# Patient Record
Sex: FEMALE | NOT HISPANIC OR LATINO | Employment: OTHER | URBAN - METROPOLITAN AREA
[De-identification: names, ages, dates, MRNs, and addresses within clinical notes are randomized per-mention and may not be internally consistent; named-entity substitution may affect disease eponyms.]

---

## 2021-04-13 ENCOUNTER — OFFICE VISIT (OUTPATIENT)
Dept: GASTROENTEROLOGY | Facility: CLINIC | Age: 61
End: 2021-04-13

## 2021-04-13 VITALS — HEIGHT: 61 IN | TEMPERATURE: 96.8 F | BODY MASS INDEX: 20.96 KG/M2 | WEIGHT: 111 LBS

## 2021-04-13 DIAGNOSIS — K58.2 IRRITABLE BOWEL SYNDROME WITH BOTH CONSTIPATION AND DIARRHEA: Primary | ICD-10-CM

## 2021-04-13 DIAGNOSIS — R63.0 ANOREXIA: ICD-10-CM

## 2021-04-13 PROCEDURE — 99204 OFFICE O/P NEW MOD 45 MIN: CPT | Performed by: INTERNAL MEDICINE

## 2021-04-13 RX ORDER — PANCRELIPASE LIPASE, PANCRELIPASE PROTEASE, PANCRELIPASE AMYLASE 40000; 126000; 168000 [USP'U]/1; [USP'U]/1; [USP'U]/1
CAPSULE, DELAYED RELEASE ORAL
COMMUNITY
Start: 2021-03-22

## 2021-04-13 RX ORDER — SUCRALFATE 1 G/1
1 TABLET ORAL AS NEEDED
COMMUNITY

## 2021-04-13 RX ORDER — LOSARTAN POTASSIUM 25 MG/1
25 TABLET ORAL DAILY
COMMUNITY
Start: 2021-03-30

## 2021-04-13 RX ORDER — RIMEGEPANT SULFATE 75 MG/75MG
TABLET, ORALLY DISINTEGRATING ORAL
COMMUNITY
Start: 2021-04-09

## 2021-04-13 RX ORDER — ROPINIROLE 5 MG/1
TABLET, FILM COATED ORAL
COMMUNITY

## 2021-04-13 RX ORDER — ESOMEPRAZOLE MAGNESIUM 40 MG/1
40 CAPSULE, DELAYED RELEASE ORAL 2 TIMES DAILY
COMMUNITY
Start: 2021-03-22

## 2021-04-13 RX ORDER — ALBUTEROL SULFATE 90 UG/1
AEROSOL, METERED RESPIRATORY (INHALATION)
COMMUNITY

## 2021-04-13 RX ORDER — ALPRAZOLAM 1 MG/1
1 TABLET ORAL AS NEEDED
COMMUNITY

## 2021-04-13 RX ORDER — HYDROCHLOROTHIAZIDE 12.5 MG/1
12.5 CAPSULE, GELATIN COATED ORAL DAILY
COMMUNITY
Start: 2021-02-27

## 2021-04-13 RX ORDER — NORTRIPTYLINE HYDROCHLORIDE 10 MG/1
10 CAPSULE ORAL NIGHTLY
Qty: 30 CAPSULE | Refills: 11 | Status: SHIPPED | OUTPATIENT
Start: 2021-04-13

## 2021-04-13 RX ORDER — POTASSIUM CHLORIDE 750 MG/1
CAPSULE, EXTENDED RELEASE ORAL
COMMUNITY
Start: 2021-04-06

## 2021-04-13 NOTE — PROGRESS NOTES
Chief Complaint   Patient presents with   • Ulcerative Colitis     Sherry Espana is a 60 y.o. female who presents with a history of abdominal pain episodic nausea diarrhea rectal bleeding.  HPI     Patient 60-year-old female with history of hypertension and asthma with migraine headaches carries a diagnosis initially of Crohn's disease and ulcerative colitis reflux and Candida esophagitis here for evaluation.  Patient's last upper and colon done in January showed minimal esophagitis with negative gastric biopsies as well as duodenal biopsies and a normal colonic biopsies inconsistent with either Crohn's or ulcerative colitis.  Work-up was extensive including ultrasound CAT scan HIDA scan gastric emptying scan all of which was well within normal limits.  Patient treated for pancreatic insufficiency as well as ulcer disease with no significant improvement in her symptoms.  Patient here for further recommendations.    Past Medical History:   Diagnosis Date   • Asthma    • Hypertension    • Migraine    • Restless leg    • Ulcerative colitis (CMS/HCC)        Current Outpatient Medications:   •  albuterol sulfate  (90 Base) MCG/ACT inhaler, albuterol sulfate HFA 90 mcg/actuation aerosol inhaler, Disp: , Rfl:   •  ALPRAZolam (XANAX) 1 MG tablet, alprazolam 1 mg tablet, Disp: , Rfl:   •  esomeprazole (nexIUM) 40 MG capsule, Take 40 mg by mouth 2 (Two) Times a Day., Disp: , Rfl:   •  hydroCHLOROthiazide (MICROZIDE) 12.5 MG capsule, , Disp: , Rfl:   •  losartan (COZAAR) 25 MG tablet, Take 25 mg by mouth Daily., Disp: , Rfl:   •  Nurtec 75 MG dispersible tablet, PLACE 1 TABLET BY TRANSLINGUAL ROUTE ON TOP OF TONGUE, ALLOW TO DISSOLVE THEN SWALLOW ONCE AS NEEDED FOR MIGRAINE; MAX 1 DOSE/24 HRS, Disp: , Rfl:   •  potassium chloride (MICRO-K) 10 MEQ CR capsule, , Disp: , Rfl:   •  rOPINIRole (REQUIP) 5 MG tablet, ropinirole 5 mg tablet, Disp: , Rfl:   •  sucralfate (CARAFATE) 1 g tablet, sucralfate 1 gram tablet, Disp: ,  Rfl:   •  Zenpep [id number] units capsule delayed-release particles capsule, TAKE 1 CAPSULE BY ORAL ROUTE 3 TIMES EVERY DAY WITH MEALS AND 1 CAPSULE WITH EACH SNACK SWALLOWING WHOLE. DO NOT CRUSH, CHEW AND/OR DIVIDE., Disp: , Rfl:   Allergies   Allergen Reactions   • Levaquin [Levofloxacin] Anaphylaxis     Social History     Socioeconomic History   • Marital status:      Spouse name: Not on file   • Number of children: Not on file   • Years of education: Not on file   • Highest education level: Not on file   Tobacco Use   • Smoking status: Current Every Day Smoker     Packs/day: 1.00   • Smokeless tobacco: Never Used   Substance and Sexual Activity   • Alcohol use: Never   • Drug use: Never     History reviewed. No pertinent family history.  Review of Systems   Constitutional: Positive for appetite change. Negative for activity change, chills, diaphoresis, fatigue, fever and unexpected weight change.   HENT: Negative.    Eyes: Negative.    Respiratory: Negative.    Cardiovascular: Negative.    Gastrointestinal: Positive for abdominal pain, constipation, diarrhea, nausea, rectal pain and vomiting. Negative for abdominal distention, anal bleeding and blood in stool.   Endocrine: Negative.    Musculoskeletal: Negative.    Skin: Negative.    Allergic/Immunologic: Negative.    Hematological: Negative.      Vitals:    04/13/21 1539   Temp: 96.8 °F (36 °C)     Physical Exam  Vitals and nursing note reviewed.   Constitutional:       Appearance: Normal appearance. She is well-developed and normal weight.   HENT:      Head: Normocephalic and atraumatic.   Eyes:      General: No scleral icterus.     Pupils: Pupils are equal, round, and reactive to light.   Cardiovascular:      Rate and Rhythm: Normal rate and regular rhythm.      Heart sounds: Normal heart sounds. No murmur heard.   No friction rub. No gallop.    Pulmonary:      Effort: Pulmonary effort is normal.      Breath sounds: Normal breath sounds. No wheezing  or rales.   Abdominal:      General: Bowel sounds are normal. There is no distension or abdominal bruit.      Palpations: Abdomen is soft. Abdomen is not rigid. There is no shifting dullness, fluid wave, mass or pulsatile mass.      Tenderness: There is no abdominal tenderness. There is no guarding.      Hernia: No hernia is present.   Musculoskeletal:         General: No swelling or tenderness. Normal range of motion.      Cervical back: Normal range of motion and neck supple. No rigidity.   Skin:     General: Skin is warm and dry.      Coloration: Skin is not jaundiced.      Findings: No rash.   Neurological:      General: No focal deficit present.      Mental Status: She is alert and oriented to person, place, and time.      Cranial Nerves: No cranial nerve deficit.   Psychiatric:         Behavior: Behavior normal.         Thought Content: Thought content normal.       Diagnoses and all orders for this visit:    Irritable bowel syndrome with both constipation and diarrhea    Anorexia    Other orders  -     ALPRAZolam (XANAX) 1 MG tablet; alprazolam 1 mg tablet  -     rOPINIRole (REQUIP) 5 MG tablet; ropinirole 5 mg tablet  -     Nurtec 75 MG dispersible tablet; PLACE 1 TABLET BY TRANSLINGUAL ROUTE ON TOP OF TONGUE, ALLOW TO DISSOLVE THEN SWALLOW ONCE AS NEEDED FOR MIGRAINE; MAX 1 DOSE/24 HRS  -     losartan (COZAAR) 25 MG tablet; Take 25 mg by mouth Daily.  -     esomeprazole (nexIUM) 40 MG capsule; Take 40 mg by mouth 2 (Two) Times a Day.  -     Zenpep [id number] units capsule delayed-release particles capsule; TAKE 1 CAPSULE BY ORAL ROUTE 3 TIMES EVERY DAY WITH MEALS AND 1 CAPSULE WITH EACH SNACK SWALLOWING WHOLE. DO NOT CRUSH, CHEW AND/OR DIVIDE.  -     potassium chloride (MICRO-K) 10 MEQ CR capsule  -     hydroCHLOROthiazide (MICROZIDE) 12.5 MG capsule  -     albuterol sulfate  (90 Base) MCG/ACT inhaler; albuterol sulfate HFA 90 mcg/actuation aerosol inhaler  -     sucralfate (CARAFATE) 1 g tablet;  sucralfate 1 gram tablet    Patient 60-year-old female with history of hypertension migraine headaches presenting with recurrent abdominal pain episodes of nausea vomiting diarrhea bleeding from the lower GI tract.  Patient asked with extensive work-up including EGD colonoscopy CT scan ultrasound and HIDA scan gastric emptying scan.  Patient with a history of diagnosis of Crohn's disease and ulcerative colitis diverticulitis reflux and Candida esophagitis.  Extensive charting brought with the patient reveals that her last set of endoscopies of upper and colon showed some foveal [place] hyperplasia in the stomach with grade a esophagitis and normal colonic biopsies.  No changes of Crohn's or ulcerative colitis though likely did have an episode of acute diverticulitis and acute esophagitis no chronic disease noted question whether this seems all to be related to IBS as a result of previous injury certainly is possible but patient now is anorexic with worry that eating will cause more pain.  Will begin treatment for IBS and monitor clinical response.  If no improvement may go further and evaluate for possible mesenteric ischemia and intestinal angina but findings more consistent with IBS.  Will treat begin with nortriptyline and follow clinical response.

## 2021-04-28 ENCOUNTER — TELEPHONE (OUTPATIENT)
Dept: GASTROENTEROLOGY | Facility: CLINIC | Age: 61
End: 2021-04-28

## 2021-04-28 NOTE — TELEPHONE ENCOUNTER
Per verbal order of Dr. Coronado: Try increasing Pamelor to 2 tablets at bedtime and add Zofran 4 mg every 8 as needed nausea

## 2021-04-28 NOTE — TELEPHONE ENCOUNTER
Returned patient's phone call. She states she has stopped vomiting acid, until this past Sunday. Poor appetite continues.   She states on Sunday she passed liquid stool and then past solid stool and then had another bout of liquid stool.   States since Sunday she has been vomiting 2-4 times a day liquid.   She complains the back of her throat is raw.   Patient is drinking Ensure and a occasional soda.   Patient states she continues taking her medication which was prescribed by Dr. Coronado.

## 2021-04-28 NOTE — TELEPHONE ENCOUNTER
Patient called. Advised as per verbal order of Dr. Coronado. She verb understanding.   Prescriptions for Nortriptyline and Zofran were called into patient's pharmacy.

## 2021-04-28 NOTE — TELEPHONE ENCOUNTER
----- Message from Mary Messer sent at 4/28/2021 11:12 AM EDT -----  Regarding: pt med update  Contact: [phone]  Pt is updating, vomitting has stopped some. Sunday, she had vomitting and diarrhea/constipation. Please call pt to advise. Thank you    nortriptyline (PAMELOR) 10 MG capsule 30 capsule 11 4/13/2021    Sig - Route: Take 1 capsule by mouth Every Night. - Oral   Sent to pharmacy as: Nortriptyline HCl 10 MG Oral Capsule (PAMELOR)   E-Prescribing Status: Receipt confirmed by pharmacy (4/13/2021  6:25 PM EDT)

## 2021-05-12 ENCOUNTER — TELEPHONE (OUTPATIENT)
Dept: GASTROENTEROLOGY | Facility: CLINIC | Age: 61
End: 2021-05-12

## 2021-05-12 NOTE — TELEPHONE ENCOUNTER
Returned phone call to JUNE Grady. She states she runs a clinic. She states the patient was seen on Friday for abdominal pain, dehydration, vomiting, and rectal bleeding. Ysabel states the patient reports the blood is bright red in color.   States she areli blood on Friday and the patient Hgb is normal. Questions if there is tests she can order before the patient is seen by Dr. Coronado on 7/8. Advised will send an update to Dr. Coronado. She verb understanding.

## 2021-05-20 RX ORDER — HYDROCORTISONE 25 MG/G
CREAM TOPICAL
Qty: 1 EACH | Refills: 0 | Status: SHIPPED | OUTPATIENT
Start: 2021-05-20

## 2021-05-20 RX ORDER — ONDANSETRON 4 MG/1
4 TABLET, FILM COATED ORAL EVERY 8 HOURS PRN
Qty: 30 TABLET | Refills: 1 | Status: SHIPPED | OUTPATIENT
Start: 2021-05-20

## 2021-06-02 ENCOUNTER — TELEPHONE (OUTPATIENT)
Dept: GASTROENTEROLOGY | Facility: CLINIC | Age: 61
End: 2021-06-02

## 2021-06-02 NOTE — TELEPHONE ENCOUNTER
----- Message from Mary Messer sent at 6/2/2021  4:19 PM EDT -----  Regarding: pt called  Contact: [phone]  Please call pt in regards to abd pain and bleeding.

## 2021-06-02 NOTE — TELEPHONE ENCOUNTER
Called pt. She states she is having a colitis/diverticulitis flare.  She was recently in [facility] for 4 days d/t colitis, diverticulitis, rectal bleeding and dehydration.  She was treated with flagyl and steroids. She is having rectal bleeding and abdominal pain again and is asking for a prescription.  She lives in [place], [place]. Her appt with Dr Coronado is 7/8/21.  Advised will send a msg to Nadia ABREU for recommendations.     I have also requested pt's medical records.

## 2021-06-03 NOTE — TELEPHONE ENCOUNTER
I do not feel comfortable prescribing antibiotics without a CT scan and labs to see what is going on.  It may or may not be diverticulitis again.  I would have her come to the [facility] ER here for immediate evaluation.  Thanks

## 2021-06-18 ENCOUNTER — TELEPHONE (OUTPATIENT)
Dept: GASTROENTEROLOGY | Facility: CLINIC | Age: 61
End: 2021-06-18

## 2021-06-18 NOTE — TELEPHONE ENCOUNTER
----- Message from Mary Sheffield sent at 6/18/2021 11:42 AM EDT -----  Regarding: talk to ronnie  Contact: [phone]  Pt left a message wanting to talk to Ronnie about her recent hospital visit.

## 2021-06-22 NOTE — TELEPHONE ENCOUNTER
Per verbal order of Dr. Coronado: patient does not have diverticulitis. She has constipation. Have patient start Miralax one cap full until BM.   Patient called, advised as per verbal order of Dr. Coronado. She verb understanding and will call on Friday with a health update.

## 2021-06-22 NOTE — TELEPHONE ENCOUNTER
Returned patient's phone call.   She states she was discharged from hospital yesterday for kidney stone. She passed the stone.   She states she has started with rectal bleeding again. She states her medical records were faxed to our office. Advised we did not received them and will call the hospital.   Called [facility] requested medical records, spoke with Ysabel who states she will fax them today.

## 2021-07-08 ENCOUNTER — OFFICE VISIT (OUTPATIENT)
Dept: GASTROENTEROLOGY | Facility: CLINIC | Age: 61
End: 2021-07-08

## 2021-07-08 VITALS — WEIGHT: 112 LBS | HEIGHT: 64 IN | BODY MASS INDEX: 19.12 KG/M2 | TEMPERATURE: 98.5 F

## 2021-07-08 DIAGNOSIS — I70.1 RENAL ARTERY STENOSIS (HCC): Primary | ICD-10-CM

## 2021-07-08 DIAGNOSIS — K58.2 IRRITABLE BOWEL SYNDROME WITH BOTH CONSTIPATION AND DIARRHEA: ICD-10-CM

## 2021-07-08 PROCEDURE — 99213 OFFICE O/P EST LOW 20 MIN: CPT | Performed by: INTERNAL MEDICINE

## 2021-07-08 RX ORDER — PROMETHAZINE HYDROCHLORIDE 12.5 MG/1
12.5 TABLET ORAL EVERY 6 HOURS PRN
Qty: 120 TABLET | Refills: 5 | Status: SHIPPED | OUTPATIENT
Start: 2021-07-08

## 2021-07-08 RX ORDER — FAMOTIDINE 40 MG/1
TABLET, FILM COATED ORAL
COMMUNITY
Start: 2021-06-21

## 2021-07-08 RX ORDER — KETOROLAC TROMETHAMINE 10 MG/1
TABLET, FILM COATED ORAL
COMMUNITY
Start: 2021-06-03

## 2021-07-08 RX ORDER — ZOLPIDEM TARTRATE 10 MG/1
TABLET ORAL
COMMUNITY
Start: 2021-06-21

## 2021-07-08 NOTE — PROGRESS NOTES
Chief Complaint   Patient presents with   • Follow-up   • Irritable Bowel Syndrome       Sherry Espana is a  60 y.o. female here for a follow up visit for IBS with abdominal pain.    HPI     Patient 60-year-old female with history of migraine headaches as well as IBS.  Ulcerative colitis listed as of the past medical history which is patently incorrect as colonoscopies have been negative.  Patient with ongoing abdominal pain alternating constipation and diarrhea with retained stool on CT.  Patient just in the hospital again with rectal bleeding dehydration found with renal artery stenosis.  Patient here for further recommendations.    Past Medical History:   Diagnosis Date   • Asthma    • Hypertension    • Migraine    • Restless leg    • Ulcerative colitis (CMS/HCC)          Current Outpatient Medications:   •  albuterol sulfate  (90 Base) MCG/ACT inhaler, albuterol sulfate HFA 90 mcg/actuation aerosol inhaler, Disp: , Rfl:   •  ALPRAZolam (XANAX) 1 MG tablet, Take 1 mg by mouth As Needed., Disp: , Rfl:   •  esomeprazole (nexIUM) 40 MG capsule, Take 40 mg by mouth 2 (Two) Times a Day., Disp: , Rfl:   •  famotidine (PEPCID) 40 MG tablet, TAKE ONE TABLET TWO TIMES A DAY FOR STOMACH, Disp: , Rfl:   •  hydroCHLOROthiazide (MICROZIDE) 12.5 MG capsule, Take 12.5 mg by mouth Daily., Disp: , Rfl:   •  Hydrocortisone, Perianal, (ANUSOL-HC) 2.5 % rectal cream, Apply cream twice daily externally to rectal area for 14 days., Disp: 1 each, Rfl: 0  •  losartan (COZAAR) 25 MG tablet, Take 25 mg by mouth Daily., Disp: , Rfl:   •  nortriptyline (PAMELOR) 10 MG capsule, Take 1 capsule by mouth Every Night., Disp: 30 capsule, Rfl: 11  •  Nurtec 75 MG dispersible tablet, PLACE 1 TABLET BY TRANSLINGUAL ROUTE ON TOP OF TONGUE, ALLOW TO DISSOLVE THEN SWALLOW ONCE AS NEEDED FOR MIGRAINE; MAX 1 DOSE/24 HRS, Disp: , Rfl:   •  ondansetron (Zofran) 4 MG tablet, Take 1 tablet by mouth Every 8 (Eight) Hours As Needed for Nausea or  Vomiting., Disp: 30 tablet, Rfl: 1  •  potassium chloride (MICRO-K) 10 MEQ CR capsule, , Disp: , Rfl:   •  rOPINIRole (REQUIP) 5 MG tablet, ropinirole 5 mg tablet, Disp: , Rfl:   •  sucralfate (CARAFATE) 1 g tablet, 1 g As Needed., Disp: , Rfl:   •  zolpidem (AMBIEN) 10 MG tablet, TAKE ONE TABLET BY MOUTH EVERY DAY AT BEDTIME AS NEEDED FOR INSOMNIA, Disp: , Rfl:   •  ketorolac (TORADOL) 10 MG tablet, , Disp: , Rfl:   •  Zenpep [id number] units capsule delayed-release particles capsule, TAKE 1 CAPSULE BY ORAL ROUTE 3 TIMES EVERY DAY WITH MEALS AND 1 CAPSULE WITH EACH SNACK SWALLOWING WHOLE. DO NOT CRUSH, CHEW AND/OR DIVIDE., Disp: , Rfl:     Allergies   Allergen Reactions   • Levaquin [Levofloxacin] Anaphylaxis       Social History     Socioeconomic History   • Marital status:      Spouse name: Not on file   • Number of children: Not on file   • Years of education: Not on file   • Highest education level: Not on file   Tobacco Use   • Smoking status: Current Every Day Smoker     Packs/day: 1.00   • Smokeless tobacco: Never Used   Substance and Sexual Activity   • Alcohol use: Never   • Drug use: Never       History reviewed. No pertinent family history.    Review of Systems   Constitutional: Negative.    Respiratory: Negative.    Cardiovascular: Negative.    Gastrointestinal: Negative.    Musculoskeletal: Negative.    Skin: Negative.    Hematological: Negative.        Vitals:    07/08/21 1419   Temp: 98.5 °F (36.9 °C)       Physical Exam  Vitals reviewed.   Constitutional:       Appearance: She is well-developed.   HENT:      Head: Normocephalic and atraumatic.   Eyes:      General: No scleral icterus.     Pupils: Pupils are equal, round, and reactive to light.   Cardiovascular:      Rate and Rhythm: Normal rate and regular rhythm.      Heart sounds: Normal heart sounds.   Pulmonary:      Effort: Pulmonary effort is normal. No respiratory distress.      Breath sounds: Normal breath sounds.   Abdominal:       General: Bowel sounds are normal. There is no distension.      Palpations: Abdomen is soft. There is no mass.      Tenderness: There is no abdominal tenderness.      Hernia: No hernia is present.   Skin:     General: Skin is warm and dry.      Coloration: Skin is not jaundiced.      Findings: No rash.   Neurological:      General: No focal deficit present.      Mental Status: She is alert and oriented to person, place, and time.   Psychiatric:         Behavior: Behavior normal.         Thought Content: Thought content normal.         No visits with results within 2 Month(s) from this visit.   Latest known visit with results is:   No results found for any previous visit.       Diagnoses and all orders for this visit:    1. Renal artery stenosis (CMS/HCC) (Primary)  -     Ambulatory Referral to Vascular Surgery    2. Irritable bowel syndrome with both constipation and diarrhea      Patient 60-year-old female with history of IBS here for follow-up.  Patient status post CT angiogram revealing significant bilateral renal artery stenosis.  Will refer to vascular surgery to evaluate for further recommendations for her ongoing constipation and alternating bowel habits we will continue Pamelor for the improved abdominal cramping she is experience but will add Linzess to 90 to help keep the bowels cleared and follow-up clinical response.

## 2021-07-15 ENCOUNTER — TELEPHONE (OUTPATIENT)
Dept: GASTROENTEROLOGY | Facility: CLINIC | Age: 61
End: 2021-07-15

## 2021-07-21 ENCOUNTER — TELEPHONE (OUTPATIENT)
Dept: GASTROENTEROLOGY | Facility: CLINIC | Age: 61
End: 2021-07-21

## 2021-07-21 NOTE — TELEPHONE ENCOUNTER
----- Message from Mary Sheffield sent at 7/21/2021 11:00 AM EDT -----  Regarding: meds  Contact: [phone]  Pt is wanting to talk to nurse regarding the new medication.

## 2021-07-22 NOTE — TELEPHONE ENCOUNTER
Returned patient's phone call. She states the combination of the Linzess and the other medications, she is feeling much better.   Requesting a prescription of Linzess 290 be called into her pharmacy.   Medication called into [facility]. Spoke with Jacky Gallagher. Verbal prescription for Linzess 290 mcg one tablet every morning #90 with 3 refills.   He verb understanding.

## 2021-07-26 ENCOUNTER — TELEPHONE (OUTPATIENT)
Dept: GASTROENTEROLOGY | Facility: CLINIC | Age: 61
End: 2021-07-26

## 2021-07-26 NOTE — TELEPHONE ENCOUNTER
----- Message from Mary Moore sent at 7/26/2021 10:19 AM EDT -----  Regarding: FAX  Contact: [phone]  PT called in stating that she spoke with [facility] where [facility] the Referral to and they asked for it to be done by phone. I spoke to David and she asked for the facesheet, CT and referral to be sent over for PT DX Renal artery stenosis. Please advise, thank you.    Fax: [phone]

## 2021-08-20 ENCOUNTER — TELEPHONE (OUTPATIENT)
Dept: GASTROENTEROLOGY | Facility: CLINIC | Age: 61
End: 2021-08-20

## 2021-08-23 NOTE — TELEPHONE ENCOUNTER
Patient called. She states she was in [facility] for diverticulitis. She is currently taking Flagyl 250 mg one tablet twice a day.   She was told by the ER doc to contact Dr. Coronado regarding her visit. She states she has the disc from the CT scan that was perform.   Advised to bring the disc with her on the day of her appointment.   Faxed request for ER notes from 8/19@ [phone]. Confirmation received.     Office notes from her visit with Dr. Hobson were scanned into patient's chart. Update to Dr. Coronado.

## 2021-08-23 NOTE — TELEPHONE ENCOUNTER
Patient called. Advised as per verbal order of Dr. Coronado, please refer patient to [facility] vascular surgery. Have patient take all of her X-ray discs with her.   Patient called, advised as per Dr. Coronado's note. She verb understanding and is agreeable to the plan. Update to Dagmar.

## 2021-09-02 ENCOUNTER — OFFICE VISIT (OUTPATIENT)
Dept: GASTROENTEROLOGY | Facility: CLINIC | Age: 61
End: 2021-09-02

## 2021-09-02 VITALS — HEIGHT: 64 IN | TEMPERATURE: 96 F | BODY MASS INDEX: 19.12 KG/M2 | WEIGHT: 112 LBS

## 2021-09-02 DIAGNOSIS — K58.2 IRRITABLE BOWEL SYNDROME WITH BOTH CONSTIPATION AND DIARRHEA: Primary | ICD-10-CM

## 2021-09-02 PROCEDURE — 99213 OFFICE O/P EST LOW 20 MIN: CPT | Performed by: INTERNAL MEDICINE

## 2021-09-02 RX ORDER — CLOPIDOGREL BISULFATE 75 MG/1
TABLET ORAL
COMMUNITY
Start: 2021-09-01

## 2021-09-02 RX ORDER — PREDNISONE 10 MG/1
TABLET ORAL
COMMUNITY
Start: 2021-09-01

## 2021-09-02 RX ORDER — ASPIRIN 81 MG/1
81 TABLET ORAL DAILY
COMMUNITY

## 2021-09-02 NOTE — PROGRESS NOTES
Chief Complaint   Patient presents with   • Follow-up       Sherry Espana is a  60 y.o. female here for a follow up visit for IBS.    HPI     Patient 60-year-old female with history of hypertension, migraine headaches as well as renal artery stenosis and IBS with alternating constipation and diarrhea.  Patient reports since starting Linzess is actually having too much diarrhea with dehydration ended up having diverticulitis per the patient.  Patient reports was in the hospital in her area in August 20 and was told she had diverticulitis and started on Flagyl.  Patient reports stools have improved and underwent vascular procedure on Monday for her renal artery stenosis.  Patient reports her blood pressure is now resolved off medicine.    Past Medical History:   Diagnosis Date   • Asthma    • Hypertension    • Migraine    • Restless leg    • Ulcerative colitis (CMS/HCC)          Current Outpatient Medications:   •  albuterol sulfate  (90 Base) MCG/ACT inhaler, albuterol sulfate HFA 90 mcg/actuation aerosol inhaler, Disp: , Rfl:   •  ALPRAZolam (XANAX) 1 MG tablet, Take 1 mg by mouth As Needed., Disp: , Rfl:   •  aspirin 81 MG EC tablet, Take 81 mg by mouth Daily., Disp: , Rfl:   •  clopidogrel (PLAVIX) 75 MG tablet, , Disp: , Rfl:   •  esomeprazole (nexIUM) 40 MG capsule, Take 40 mg by mouth 2 (Two) Times a Day., Disp: , Rfl:   •  famotidine (PEPCID) 40 MG tablet, TAKE ONE TABLET TWO TIMES A DAY FOR STOMACH, Disp: , Rfl:   •  hydroCHLOROthiazide (MICROZIDE) 12.5 MG capsule, Take 12.5 mg by mouth Daily., Disp: , Rfl:   •  Hydrocortisone, Perianal, (ANUSOL-HC) 2.5 % rectal cream, Apply cream twice daily externally to rectal area for 14 days., Disp: 1 each, Rfl: 0  •  ketorolac (TORADOL) 10 MG tablet, , Disp: , Rfl:   •  losartan (COZAAR) 25 MG tablet, Take 25 mg by mouth Daily., Disp: , Rfl:   •  nortriptyline (PAMELOR) 10 MG capsule, Take 1 capsule by mouth Every Night., Disp: 30 capsule, Rfl: 11  •  Nurtec 75  MG dispersible tablet, PLACE 1 TABLET BY TRANSLINGUAL ROUTE ON TOP OF TONGUE, ALLOW TO DISSOLVE THEN SWALLOW ONCE AS NEEDED FOR MIGRAINE; MAX 1 DOSE/24 HRS, Disp: , Rfl:   •  ondansetron (Zofran) 4 MG tablet, Take 1 tablet by mouth Every 8 (Eight) Hours As Needed for Nausea or Vomiting., Disp: 30 tablet, Rfl: 1  •  potassium chloride (MICRO-K) 10 MEQ CR capsule, , Disp: , Rfl:   •  predniSONE (DELTASONE) 10 MG tablet, , Disp: , Rfl:   •  promethazine (PHENERGAN) 12.5 MG tablet, Take 1 tablet by mouth Every 6 (Six) Hours As Needed for Nausea or Vomiting., Disp: 120 tablet, Rfl: 5  •  rOPINIRole (REQUIP) 5 MG tablet, ropinirole 5 mg tablet, Disp: , Rfl:   •  sucralfate (CARAFATE) 1 g tablet, 1 g As Needed., Disp: , Rfl:   •  Zenpep 47450-171071 units capsule delayed-release particles capsule, TAKE 1 CAPSULE BY ORAL ROUTE 3 TIMES EVERY DAY WITH MEALS AND 1 CAPSULE WITH EACH SNACK SWALLOWING WHOLE. DO NOT CRUSH, CHEW AND/OR DIVIDE., Disp: , Rfl:   •  zolpidem (AMBIEN) 10 MG tablet, TAKE ONE TABLET BY MOUTH EVERY DAY AT BEDTIME AS NEEDED FOR INSOMNIA, Disp: , Rfl:   •  linaclotide (Linzess) 145 MCG capsule capsule, Take 1 capsule by mouth Every Morning Before Breakfast., Disp: 30 capsule, Rfl: 11    Allergies   Allergen Reactions   • Levaquin [Levofloxacin] Anaphylaxis       Social History     Socioeconomic History   • Marital status:      Spouse name: Not on file   • Number of children: Not on file   • Years of education: Not on file   • Highest education level: Not on file   Tobacco Use   • Smoking status: Current Every Day Smoker     Packs/day: 1.00   • Smokeless tobacco: Never Used   Substance and Sexual Activity   • Alcohol use: Never   • Drug use: Never       History reviewed. No pertinent family history.    Review of Systems   Constitutional: Negative.    Respiratory: Negative.    Cardiovascular: Negative.    Gastrointestinal: Negative.    Musculoskeletal: Negative.    Skin: Negative.    Hematological:  Negative.        Vitals:    09/02/21 1509   Temp: 96 °F (35.6 °C)       Physical Exam  Vitals reviewed.   Constitutional:       Appearance: Normal appearance. She is well-developed.   HENT:      Head: Normocephalic and atraumatic.   Eyes:      General: No scleral icterus.     Pupils: Pupils are equal, round, and reactive to light.   Cardiovascular:      Rate and Rhythm: Normal rate and regular rhythm.      Heart sounds: Normal heart sounds.   Pulmonary:      Effort: Pulmonary effort is normal. No respiratory distress.      Breath sounds: Normal breath sounds.   Abdominal:      General: Bowel sounds are normal. There is no distension.      Palpations: Abdomen is soft. There is no mass.      Tenderness: There is no abdominal tenderness.      Hernia: No hernia is present.   Skin:     General: Skin is warm and dry.      Coloration: Skin is not jaundiced.      Findings: No rash.   Neurological:      General: No focal deficit present.      Mental Status: She is alert and oriented to person, place, and time.      Cranial Nerves: No cranial nerve deficit.   Psychiatric:         Behavior: Behavior normal.         Thought Content: Thought content normal.         Judgment: Judgment normal.         No visits with results within 2 Month(s) from this visit.   Latest known visit with results is:   No results found for any previous visit.       Diagnoses and all orders for this visit:    1. Irritable bowel syndrome with both constipation and diarrhea (Primary)    Other orders  -     linaclotide (Linzess) 145 MCG capsule capsule; Take 1 capsule by mouth Every Morning Before Breakfast.  Dispense: 30 capsule; Refill: 11      Patient 60-year-old female with history of renal artery stenosis and IBS with alternating constipation diarrhea here for follow-up.  Patient reports presented to the emergency room August 20 with abdominal pain and diarrhea reportedly diagnosed with diverticulitis and started on Flagyl.  Patient feeling improved  particularly since underwent vascular procedure on Monday with resolution of her renal artery stenosis.  Patient sounds like she was angioplastied on one side and stented on the other currently on Plavix and baby aspirin.  Patient reports she is now off of her blood pressure medicines doing well.  For now we will continue the Flagyl as directed await report from emergency room for details in both the CT and her treatment.  For now will continue the Linzess but decrease the dose.  Patient was on the 290 mcg dose of Linzess but apparently patient was becoming dehydrated from diarrhea.  We will decrease to 145 and monitor effect.

## 2025-03-05 NOTE — TELEPHONE ENCOUNTER
----- Message from Paola Naranjo RN sent at 8/20/2021  8:30 AM EDT -----  Regarding: FW: question  Contact: [phone]  Cliff pt.  ----- Message -----  From: Denise Millan RegSched Rep  Sent: 8/20/2021   8:11 AM EDT  To: [facility]  Subject: question                                         Spoke with pt and she stated that an ER doctor told her she needed to be seen today in this office and she lives 5 hrs away       A1c is 7.1.  This is above our goal which is less than 7.  Please inquire if patient has been taking metformin 500 mg or 1000 mg twice daily.  B12 levels are low.  I advise to start B12 injections once a week for 4 weeks and then 1000 mcg daily.  Electrolytes, kidney and liver function, complete blood count are within acceptable range  Lipid panel with excellent LDL control.  Continue statin  Vitamin D levels, urine microalbumin levels are within normal limits.